# Patient Record
Sex: FEMALE | Race: WHITE | NOT HISPANIC OR LATINO | Employment: FULL TIME | URBAN - METROPOLITAN AREA
[De-identification: names, ages, dates, MRNs, and addresses within clinical notes are randomized per-mention and may not be internally consistent; named-entity substitution may affect disease eponyms.]

---

## 2023-01-17 ENCOUNTER — OFFICE VISIT (OUTPATIENT)
Dept: URGENT CARE | Facility: MEDICAL CENTER | Age: 23
End: 2023-01-17

## 2023-01-17 VITALS
WEIGHT: 195 LBS | SYSTOLIC BLOOD PRESSURE: 122 MMHG | HEIGHT: 64 IN | TEMPERATURE: 98.3 F | DIASTOLIC BLOOD PRESSURE: 80 MMHG | RESPIRATION RATE: 15 BRPM | BODY MASS INDEX: 33.29 KG/M2 | HEART RATE: 69 BPM | OXYGEN SATURATION: 98 %

## 2023-01-17 DIAGNOSIS — H65.02 ACUTE SEROUS OTITIS MEDIA OF LEFT EAR, RECURRENCE NOT SPECIFIED: Primary | ICD-10-CM

## 2023-01-17 RX ORDER — PROMETHAZINE HYDROCHLORIDE 25 MG/1
TABLET ORAL
COMMUNITY

## 2023-01-17 RX ORDER — OMEPRAZOLE 20 MG/1
20 CAPSULE, DELAYED RELEASE ORAL
COMMUNITY

## 2023-01-17 RX ORDER — FLUTICASONE PROPIONATE 50 MCG
1 SPRAY, SUSPENSION (ML) NASAL DAILY
Qty: 15.8 ML | Refills: 0 | Status: SHIPPED | OUTPATIENT
Start: 2023-01-17 | End: 2023-02-16

## 2023-01-17 NOTE — LETTER
January 17, 2023     Patient: Jess Johnson   YOB: 2000   Date of Visit: 1/17/2023       To Whom It May Concern: It is my medical opinion that Jess Johnson may return to work on 1/18/2023  If you have any questions or concerns, please don't hesitate to call           Sincerely,        Galen Gipson PA-C    CC: No Recipients

## 2023-01-17 NOTE — PATIENT INSTRUCTIONS
Take flonase as directed  Tyleno/NSAIDs as directed on the bottle  Claritin as directed on the bottle  Follow up with PCP in 3-5 days if symptoms do not improve

## 2023-01-17 NOTE — PROGRESS NOTES
[facility]        NAME: Fan Barnes is a 25 y o  female  : 2000    MRN: [id number]  DATE: 2023  TIME: 1:52 PM    Assessment and Plan   Acute serous otitis media of left ear, recurrence not specified [H65 [id number]]  1  Acute serous otitis media of left ear, recurrence not specified  fluticasone (FLONASE) 50 mcg/act nasal spray            Patient Instructions     Take flonase as directed  Tyleno/NSAIDs as directed on the bottle  Claritin as directed on the bottle    Follow up with PCP in 3-5 days  Proceed to  ER if symptoms worsen  Chief Complaint     Chief Complaint   Patient presents with   • Earache     Starting Saturday pt states she began to experience severe pain in L ear  Pain steady since Saturday  States hearing is reduced in ear  History of Present Illness       13-year-old female presents to the urgent care today for evaluation of left ear pain and fullness  Patient states that her symptoms began on Saturday, 2023  She states that she awoke feeling a throbbing in her ear as well as a sense of fullness  She admits to a low-grade temperature on Saturday, but denies any cough, sore throat, nausea, vomiting, diarrhea, abdominal pain  Patient denies any sick contacts  Review of Systems   Review of Systems   Constitutional: Positive for fever (Saturday)  Negative for chills  HENT: Positive for ear pain  Negative for congestion, sinus pain, sore throat and tinnitus  Respiratory: Negative for cough and shortness of breath  Cardiovascular: Negative for chest pain  Gastrointestinal: Negative for abdominal pain, diarrhea, nausea and vomiting  Musculoskeletal: Negative for arthralgias  Skin: Negative for color change and rash  Neurological: Negative for dizziness and headaches  Psychiatric/Behavioral: Negative  All other systems reviewed and are negative          Current Medications       Current Outpatient Medications:   •  fluticasone (FLONASE) 50 mcg/act nasal spray, 1 spray into each nostril daily, Disp: 15 8 mL, Rfl: 0  •  Multiple Vitamins-Iron TABS, Take by mouth, Disp: , Rfl:   •  omeprazole (PriLOSEC) 20 mg delayed release capsule, Take 20 mg by mouth, Disp: , Rfl:     Current Allergies     Allergies as of 01/17/2023   • (No Known Allergies)            The following portions of the patient's history were reviewed and updated as appropriate: allergies, current medications, past family history, past medical history, past social history, past surgical history and problem list      Past Medical History:   Diagnosis Date   • No pertinent past medical history        Past Surgical History:   Procedure Laterality Date   • TONSILLECTOMY         History reviewed  No pertinent family history  Medications have been verified  Objective   /80 (BP Location: Right arm, Patient Position: Sitting, Cuff Size: Large)   Pulse 69   Temp 98 3 °F (36 8 °C) (Tympanic)   Resp 15   Ht 5' 4" (1 626 m)   Wt 88 5 kg (195 lb)   LMP 01/02/2023 (Approximate)   SpO2 98%   BMI 33 47 kg/m²        Physical Exam     Physical Exam  Constitutional:       Appearance: Normal appearance  HENT:      Head: Normocephalic and atraumatic  Right Ear: Tympanic membrane normal       Left Ear: A middle ear effusion is present  Tympanic membrane is not injected, perforated or erythematous  Nose: Nose normal       Mouth/Throat:      Pharynx: Oropharynx is clear  Eyes:      Extraocular Movements: Extraocular movements intact  Pupils: Pupils are equal, round, and reactive to light  Cardiovascular:      Rate and Rhythm: Normal rate and regular rhythm  Pulses: Normal pulses  Heart sounds: Normal heart sounds  Pulmonary:      Effort: Pulmonary effort is normal  No respiratory distress  Breath sounds: Normal breath sounds  No wheezing or rhonchi  Abdominal:      Palpations: Abdomen is soft     Musculoskeletal:      Cervical back: Normal range of motion  Skin:     General: Skin is warm and dry  Capillary Refill: Capillary refill takes less than 2 seconds  Neurological:      General: No focal deficit present  Mental Status: She is alert and oriented to person, place, and time     Psychiatric:         Mood and Affect: Mood normal          Behavior: Behavior normal

## 2023-10-17 ENCOUNTER — HOSPITAL ENCOUNTER (EMERGENCY)
Facility: HOSPITAL | Age: 23
Discharge: HOME | End: 2023-10-17
Attending: EMERGENCY MEDICINE | Admitting: EMERGENCY MEDICINE

## 2023-10-17 VITALS
WEIGHT: 229.72 LBS | BODY MASS INDEX: 38.27 KG/M2 | RESPIRATION RATE: 18 BRPM | SYSTOLIC BLOOD PRESSURE: 120 MMHG | HEIGHT: 65 IN | HEART RATE: 72 BPM | TEMPERATURE: 97.7 F | DIASTOLIC BLOOD PRESSURE: 59 MMHG | OXYGEN SATURATION: 98 %

## 2023-10-17 DIAGNOSIS — M54.50 ACUTE LOW BACK PAIN: Primary | ICD-10-CM

## 2023-10-17 LAB
EXT PREGNANCY TEST URINE: NEGATIVE
EXT. CONTROL: NORMAL

## 2023-10-17 PROCEDURE — 81025 URINE PREGNANCY TEST: CPT | Performed by: PHYSICIAN ASSISTANT

## 2023-10-17 RX ORDER — KETOROLAC TROMETHAMINE 30 MG/ML
15 INJECTION, SOLUTION INTRAMUSCULAR; INTRAVENOUS ONCE
Status: COMPLETED | OUTPATIENT
Start: 2023-10-17 | End: 2023-10-17

## 2023-10-17 RX ORDER — METHOCARBAMOL 500 MG/1
500 TABLET, FILM COATED ORAL 2 TIMES DAILY
Qty: 20 TABLET | Refills: 0 | Status: SHIPPED | OUTPATIENT
Start: 2023-10-17

## 2023-10-17 RX ORDER — KETOROLAC TROMETHAMINE 30 MG/ML
15 INJECTION, SOLUTION INTRAMUSCULAR; INTRAVENOUS ONCE
Status: DISCONTINUED | OUTPATIENT
Start: 2023-10-17 | End: 2023-10-17

## 2023-10-17 RX ORDER — METHOCARBAMOL 500 MG/1
500 TABLET, FILM COATED ORAL ONCE
Status: COMPLETED | OUTPATIENT
Start: 2023-10-17 | End: 2023-10-17

## 2023-10-17 RX ORDER — LIDOCAINE 50 MG/G
1 PATCH TOPICAL ONCE
Status: DISCONTINUED | OUTPATIENT
Start: 2023-10-17 | End: 2023-10-18 | Stop reason: HOSPADM

## 2023-10-17 RX ADMIN — KETOROLAC TROMETHAMINE 15 MG: 30 INJECTION, SOLUTION INTRAMUSCULAR; INTRAVENOUS at 22:19

## 2023-10-17 RX ADMIN — LIDOCAINE 1 PATCH: 700 PATCH TOPICAL at 22:18

## 2023-10-17 RX ADMIN — METHOCARBAMOL 500 MG: 500 TABLET ORAL at 22:17

## 2023-10-17 NOTE — Clinical Note
Murel Gitelman was seen and treated in our emergency department on 10/17/2023. Diagnosis: Back Pain    Ana M Padron  may return to work on return date. She may return on this date: 10/19/2023         If you have any questions or concerns, please don't hesitate to call.       INÉS ROWAN [facility], PA-C    ______________________________           _______________          _______________  Hospital Representative                              Date                                Time

## 2023-10-18 ENCOUNTER — TELEPHONE (OUTPATIENT)
Dept: PHYSICAL THERAPY | Facility: OTHER | Age: 23
End: 2023-10-18

## 2023-10-18 NOTE — ED PROVIDER NOTES
History  Chief Complaint   Patient presents with    Back Pain     Pt reports breaking tailbone in  and has intermittent pain. Pt reports severe and worsening pain for the past few days after bending over to  cat. Pt reports having spasms throughout back, difficulty standing and sitting, and pins and needles in left leg since today. Pt denies changes in bowel or bladder. This is a 56-year-old female presenting to the ED for evaluation of low back pain. Patient states she has a history of coccyx fracture in  and has had intermittent pain since. Patient states that she bent over to lift her cat who she states is very heavy and soon after started with this low back pain. She endorses pain worsening with bending, twisting, sitting to stand. She feels like she has spasms throughout the lower back. She states the pain radiates to the bilateral lower extremities. She denies any saddle anesthesia, urinary retention, bowel or bladder incontinence, urinary frequency or urgency, hematuria. History provided by:  Patient   used: No        Prior to Admission Medications   Prescriptions Last Dose Informant Patient Reported? Taking? Multiple Vitamins-Iron TABS   Yes No   Sig: Take by mouth   fluticasone (FLONASE) 50 mcg/act nasal spray   No No   Si spray into each nostril daily   omeprazole (PriLOSEC) 20 mg delayed release capsule   Yes No   Sig: Take 20 mg by mouth      Facility-Administered Medications: None       Past Medical History:   Diagnosis Date    No pertinent past medical history        Past Surgical History:   Procedure Laterality Date    TONSILLECTOMY         History reviewed. No pertinent family history. I have reviewed and agree with the history as documented.     E-Cigarette/Vaping     E-Cigarette/Vaping Substances     Social History     Tobacco Use    Smoking status: Never    Smokeless tobacco: Never       Review of Systems   Musculoskeletal:  Positive for back pain and gait problem (2/2 pain). All other systems reviewed and are negative. Physical Exam  Physical Exam  Vitals reviewed. Constitutional:       General: She is not in acute distress. Appearance: Normal appearance. She is well-developed and well-groomed. She is not ill-appearing. Comments: Sitting in bed comfortably   HENT:      Head: Normocephalic and atraumatic. Right Ear: External ear normal.      Left Ear: External ear normal.      Nose: Nose normal.   Eyes:      General: No scleral icterus. Conjunctiva/sclera: Conjunctivae normal.   Cardiovascular:      Rate and Rhythm: Normal rate and regular rhythm. Pulmonary:      Effort: Pulmonary effort is normal.      Breath sounds: No stridor. Abdominal:      General: There is no distension. Musculoskeletal:         General: No deformity. Cervical back: Normal range of motion. Lumbar back: Spasms and tenderness present. Decreased range of motion. Positive left straight leg raise test. Negative right straight leg raise test.      Comments: Limited ROM at lumbar spine 2/2 pain. Strength and sensation intact throughout. Distal pulses palpable. Skin:     Coloration: Skin is not jaundiced or pale. Findings: No lesion or rash. Neurological:      General: No focal deficit present. Mental Status: She is alert and oriented to person, place, and time. GCS: GCS eye subscore is 4. GCS verbal subscore is 5. GCS motor subscore is 6. Cranial Nerves: Cranial nerves 2-12 are intact. Sensory: Sensation is intact. Motor: No weakness. Psychiatric:         Mood and Affect: Mood normal.         Behavior: Behavior normal. Behavior is cooperative.          Vital Signs  ED Triage Vitals [10/17/23 2003]   Temperature Pulse Respirations Blood Pressure SpO2   97.7 °F (36.5 °C) 72 18 120/59 98 %      Temp Source Heart Rate Source Patient Position - Orthostatic VS BP Location FiO2 (%)   Oral Monitor Sitting Right arm --      Pain Score       8           Vitals:    10/17/23 2003   BP: 120/59   Pulse: 72   Patient Position - Orthostatic VS: Sitting         Visual Acuity      ED Medications  Medications   lidocaine (LIDODERM) 5 % patch 1 patch (1 patch Topical Medication Applied 10/17/23 2218)   methocarbamol (ROBAXIN) tablet 500 mg (500 mg Oral Given 10/17/23 2217)   ketorolac (TORADOL) injection 15 mg (15 mg Intramuscular Given 10/17/23 2219)       Diagnostic Studies  Results Reviewed       Procedure Component Value Units Date/Time    POCT pregnancy, urine [[id number]]  (Normal) Resulted: 10/17/23 2215    Lab Status: Final result Updated: 10/17/23 2233     EXT Preg Test, Ur Negative     Control Valid                   No orders to display              Procedures  Procedures         ED Course             Medical Decision Making      DDx including but not limited to: sciatica, herniated disc, arthritis, spinal stenosis, strain, sprain, fracture; doubt cauda equina syndrome, epidural abscess, transverse myelitis, AAA. The patient presented complaining of back pain. There was no history of recent fall or blunt trauma. However, history elicited activity likely causing muscular strain and injury. There was no evidence to support genitourinary etiology. No fevers or other evidence to suspect infectious processes. There is no saddle paresthesias reported and no bowel or bladder incontinence or retention. The patient's neurological exam is normal with full strength and sensation bilateral LE. No midline tenderness. Toradol, Robaxin and Lidocaine patch for pain control. Diagnosis and risks discussed with patient bedside. Discussed discharge instructions with patient bedside, patient is comfortable and agreeable to discharge at this time and to follow-up with their primary doctor and with [facility].   Discussed signs and symptoms that warrant return to the emergency department including but not limited to: saddle anesthesia, urinary or bowel incontinence or retention, fevers, changing or worsening pain. The patient understands and agrees with plan of care. Questions and concerns answered. Amount and/or Complexity of Data Reviewed  Labs: ordered. Risk  Prescription drug management. Disposition  Final diagnoses:   Acute low back pain     Time reflects when diagnosis was documented in both MDM as applicable and the Disposition within this note       Time User Action Codes Description Comment    10/17/2023 10:36 PM Cassidy Dan Add [M54.50] Acute low back pain           ED Disposition       ED Disposition   Discharge    Condition   Stable    Date/Time   Tue Oct 17, 2023 10:36 PM    Comment   Baylee Flow discharge to home/self care.              Follow-up Information       Follow up With Specialties Details Why Contact Info Additional Information    [facility] Physical Therapy Call today  [phone]            Patient's Medications   Discharge Prescriptions    METHOCARBAMOL (ROBAXIN) 500 MG TABLET    Take 1 tablet (500 mg total) by mouth 2 (two) times a day       Start Date: 10/17/2023End Date: --       Order Dose: 500 mg       Quantity: 20 tablet    Refills: 0           PDMP Review       None            ED Provider  Electronically Signed by Farida Proctor PA-C  10/17/23 8199

## 2023-10-18 NOTE — TELEPHONE ENCOUNTER
Call placed to the patient per [facility] referral.    Voice message left for patient to call back. Phone number and hours of business provided. This is the 1st attempt to reach the patient. Will defer per protocol.     H.I??

## 2023-10-18 NOTE — DISCHARGE INSTRUCTIONS
Please follow up with [facility]. Referral has been placed and contact information have been provided below. Please follow up with family medicine if symptoms continue for pain management. Please take Robaxin as prescribed to the pharmacy. This is a muscle relaxer. Do not drive or operate heavy machinery while taking this medication. Take tylenol and motrin for pain. Take as directed on the box. Can use lidoderm patch or lidoderm gel over the counter for pain. Apply heat to the area 15-20 minutes up to 4 times a day. Please return if symptoms worsen or new symptoms develop.

## 2023-10-25 NOTE — TELEPHONE ENCOUNTER
Call placed to the patient per [facility] referral.     V/M left for patient to call back. Phone number and hours of business provided. This is the 2nd attempt to reach the patient. Will defer per protocol.      H.I??